# Patient Record
Sex: MALE | Race: WHITE | NOT HISPANIC OR LATINO | Employment: OTHER | URBAN - METROPOLITAN AREA
[De-identification: names, ages, dates, MRNs, and addresses within clinical notes are randomized per-mention and may not be internally consistent; named-entity substitution may affect disease eponyms.]

---

## 2021-02-13 ENCOUNTER — IMMUNIZATIONS (OUTPATIENT)
Dept: FAMILY MEDICINE CLINIC | Facility: HOSPITAL | Age: 86
End: 2021-02-13

## 2021-02-13 DIAGNOSIS — Z23 ENCOUNTER FOR IMMUNIZATION: Primary | ICD-10-CM

## 2021-02-13 PROCEDURE — 91301 SARS-COV-2 / COVID-19 MRNA VACCINE (MODERNA) 100 MCG: CPT

## 2021-02-13 PROCEDURE — 0011A SARS-COV-2 / COVID-19 MRNA VACCINE (MODERNA) 100 MCG: CPT

## 2021-03-13 ENCOUNTER — IMMUNIZATIONS (OUTPATIENT)
Dept: FAMILY MEDICINE CLINIC | Facility: HOSPITAL | Age: 86
End: 2021-03-13

## 2021-03-13 DIAGNOSIS — Z23 ENCOUNTER FOR IMMUNIZATION: Primary | ICD-10-CM

## 2021-03-13 PROCEDURE — 0012A SARS-COV-2 / COVID-19 MRNA VACCINE (MODERNA) 100 MCG: CPT

## 2021-03-13 PROCEDURE — 91301 SARS-COV-2 / COVID-19 MRNA VACCINE (MODERNA) 100 MCG: CPT

## 2024-08-09 ENCOUNTER — DOCUMENTATION (OUTPATIENT)
Dept: HEMATOLOGY ONCOLOGY | Facility: CLINIC | Age: 89
End: 2024-08-09

## 2024-08-09 NOTE — PROGRESS NOTES
Referral received/ Chart reviewed for work up completed for referral to radiation oncology     Imaging completed: NA    Pathology completed:  7/19/2024 at [facility]  accession #[id number]    PN please retrieve outside records.

## 2024-08-14 ENCOUNTER — PATIENT OUTREACH (OUTPATIENT)
Dept: HEMATOLOGY ONCOLOGY | Facility: CLINIC | Age: 89
End: 2024-08-14

## 2024-08-14 PROBLEM — C44.319 BASAL CELL CARCINOMA OF SKIN OF OTHER PARTS OF FACE: Status: ACTIVE | Noted: 2024-08-14

## 2024-08-14 NOTE — PROGRESS NOTES
Intake received, chart reviewed for need of external records.  Consulting: Dr. QIU  Scheduled on 8/19/2024  Dx: BASAL CELL   ICD: C44.319    Pathology requested:   From [facility]  phone [phone], via fax [phone]  Accession # [id number]  Slides will be sent directly to [facility] Pathology lab at [address] [place], [place] [address]e [facility].     Images requested:  From N/A phone , via fax   If not uploaded electronically via [employer], disks will be sent directly to the Radiology Reading Room.

## 2024-08-19 ENCOUNTER — CONSULT (OUTPATIENT)
Facility: HOSPITAL | Age: 89
End: 2024-08-19
Attending: RADIOLOGY
Payer: COMMERCIAL

## 2024-08-19 ENCOUNTER — TELEPHONE (OUTPATIENT)
Age: 89
End: 2024-08-19

## 2024-08-19 VITALS
HEIGHT: 66 IN | SYSTOLIC BLOOD PRESSURE: 118 MMHG | BODY MASS INDEX: 32.47 KG/M2 | WEIGHT: 202 LBS | RESPIRATION RATE: 16 BRPM | HEART RATE: 60 BPM | DIASTOLIC BLOOD PRESSURE: 74 MMHG | OXYGEN SATURATION: 96 % | TEMPERATURE: 97.2 F

## 2024-08-19 DIAGNOSIS — C44.319 BASAL CELL CARCINOMA OF SKIN OF OTHER PARTS OF FACE: Primary | ICD-10-CM

## 2024-08-19 PROCEDURE — 99205 OFFICE O/P NEW HI 60 MIN: CPT | Performed by: RADIOLOGY

## 2024-08-19 PROCEDURE — 99211 OFF/OP EST MAY X REQ PHY/QHP: CPT | Performed by: RADIOLOGY

## 2024-08-19 RX ORDER — SILDENAFIL CITRATE 20 MG/1
20 TABLET ORAL 3 TIMES DAILY
COMMUNITY

## 2024-08-19 RX ORDER — LOSARTAN POTASSIUM 50 MG/1
50 TABLET ORAL DAILY
COMMUNITY

## 2024-08-19 RX ORDER — FUROSEMIDE 40 MG
40 TABLET ORAL 2 TIMES DAILY
COMMUNITY

## 2024-08-19 NOTE — PROGRESS NOTES
Consultation - Radiation Oncology      MRN:[id number] : 1932  Encounter: [id number]  Patient Information: Dom Lees      CHIEF COMPLAINT  Chief Complaint   Patient presents with    Consult     Radiation oncology     Cancer Staging   Basal cell carcinoma of the nasal bridge, left forehead and left cheek.           History of Present Illness   Dom Lees is a 89 y.o. male with a history of multiple nonmelanomatous skin cancers who presents for consideration of treatment options.    Shave biopsy of the nasal bridge was performed on 5/10/24.  Pathology was consistent with basal cell carcinoma, nodular and infiltrating.    The patient had biopsies of the left medial cheek, right lateral zygoma and left lateral forehead performed on 2024.  Pathology from the left medial check was consistent with basal cell carcinoma, nodular.  Shave biopsy of the right lateral zygoma was also basal cell carcinoma, nodular.  Finally, shave biopsy of the left lateral forehead was basal cell carcinoma, nodular and infiltrating with morpheaform features.    He reportedly underwent Mohs procedure for management of the lesion on the right lateral zygoma.      He was referred for consideration of radiation to the nasal bridge of the node and left lateral forehead/left medial cheek.  .      Upon interview, he endorses the above history.  He notes an ulcerative lesion on the bridge of the nose with rolled borders.  He has several hyperkeratotic and hyperpigmented lesions as well as skin tags across the face and he is not certain which of these were biopsied.  He has a well healed Mohs resection site over the right cheek.  He is without additional acute concerns.    Historical Information   Oncology History   Basal cell carcinoma of skin of other parts of face   5/10/2024 Biopsy    Performed at [facility]    FINAL DIAGNOSIS:  SKIN, Nasal bridge, shave:  Basal cell carcinoma, nodular and infiltrating.  pTxMx     7/19/2024 Biopsy    Performed at [facility]    FINAL DIAGNOSIS:  SKIN, Left medial cheek, Shave:         Basal cell carcinoma, nodular. pTxMx  SKIN, Right lateral zygoma, Shave:         Basal cell carcinoma, nodular. pTxMx  3.    SKIN, Left lateral forehead, Shave:         Basal cell carcinoma, nodular and infiltrating with morpheaform features. pTxMx       8/14/2024 Initial Diagnosis    Basal cell carcinoma of skin of other parts of face           Past Medical History:   Diagnosis Date    BPH (benign prostatic hypertrophy)     Hyperlipidemia     Hypertension      Past Surgical History:   Procedure Laterality Date    HERNIA REPAIR      JOINT REPLACEMENT      right TKR    NJ XCAPSL CTRC RMVL INSJ IO LENS PROSTH W/O ECP Right 8/3/2016    Procedure: OD EXTRACTION EXTRACAPSULAR CATARACT PHACO INTRAOCULAR LENS (IOL);  Surgeon: Dallas Geiger MD;  Location:  MAIN OR;  Service: Ophthalmology    RETINAL DETACHMENT SURGERY N/A     pt not sure which one       History reviewed. No pertinent family history.    Social History   Social History     Substance and Sexual Activity   Alcohol Use Not Currently     Social History     Substance and Sexual Activity   Drug Use No     Social History     Tobacco Use   Smoking Status Never   Smokeless Tobacco Not on file         Meds/Allergies     Current Outpatient Medications:     apixaban (Eliquis) 2.5 mg, Take by mouth 2 (two) times a day, Disp: , Rfl:     aspirin 81 MG tablet, Take 81 mg by mouth daily., Disp: , Rfl:     cyanocobalamin 1000 MCG tablet, Take 100 mcg by mouth daily., Disp: , Rfl:     furosemide (LASIX) 40 mg tablet, Take 40 mg by mouth 2 (two) times a day, Disp: , Rfl:     losartan (COZAAR) 50 mg tablet, Take 50 mg by mouth daily, Disp: , Rfl:     metFORMIN (GLUMETZA) 1000 MG (MOD) 24 hr tablet, Take 1,000 mg by mouth 2 (two) times a day., Disp: , Rfl:     sildenafil (REVATIO) 20 mg tablet, Take 20 mg by mouth 3 (three) times a day, Disp:  ", Rfl:     simvastatin (ZOCOR) 20 mg tablet, Take 20 mg by mouth daily at bedtime., Disp: , Rfl:     tamsulosin (FLOMAX) 0.4 mg, Take 0.4 mg by mouth daily with dinner., Disp: , Rfl:     valsartan-hydrochlorothiazide (DIOVAN-HCT) 160-12.5 MG per tablet, Take 1 tablet by mouth daily., Disp: , Rfl:     amLODIPine (NORVASC) 5 mg tablet, Take 5 mg by mouth daily. (Patient not taking: Reported on 8/19/2024), Disp: , Rfl:     glimepiride (AMARYL) 2 mg tablet, Take 2 mg by mouth every morning before breakfast. (Patient not taking: Reported on 8/19/2024), Disp: , Rfl:   No Known Allergies      Review of Systems  Constitutional: Negative.    HENT: Negative.     Eyes: Negative.    Respiratory: Negative.     Cardiovascular: Negative.    Gastrointestinal: Negative.    Endocrine: Negative.    Genitourinary:  Positive for frequency.   Musculoskeletal: Negative.    Skin: Negative.    Allergic/Immunologic: Negative.    Neurological: Negative.    Hematological:  Bruises/bleeds easily.   Psychiatric/Behavioral: Negative.         OBJECTIVE:   /74 (BP Location: Left arm, Patient Position: Sitting, Cuff Size: Standard)   Pulse 60   Temp (!) 97.2 °F (36.2 °C) (Temporal)   Resp 16   Ht 5' 6\" (1.676 m)   Wt 91.6 kg (202 lb)   SpO2 96%   BMI 32.60 kg/m²   Pain Assessment:  0  Performance Status: Karnofsky: 90 - Able to carry on normal activity; minor signs or symptoms of disease     Physical Exam  HENT:      Head: Normocephalic and atraumatic.   Eyes:      General: No scleral icterus.     Extraocular Movements: Extraocular movements intact.   Cardiovascular:      Rate and Rhythm: Normal rate.   Pulmonary:      Effort: Pulmonary effort is normal.   Abdominal:      General: Abdomen is flat. There is no distension.   Musculoskeletal:         General: Normal range of motion.      Cervical back: Normal range of motion.   Skin:     General: Skin is warm and dry.      Comments: 2 cm ulcerative lesion involving the mid portion of the " "nasal bridge with rolled borders.  3 mm minimally hyperkeratotic lesion involving the left inferior cheek and 1.5 cm lesion involving the left forehead.     Neurological:      General: No focal deficit present.      Mental Status: He is alert.   Psychiatric:         Mood and Affect: Mood normal.          RESULTS  Lab Results    Chemistry        Component Value Date/Time    K 4.1 07/15/2016 1139     07/15/2016 1139    CO2 27 07/15/2016 1139    BUN 27 (H) 07/15/2016 1139    CREATININE 1.41 (H) 07/15/2016 1139        Component Value Date/Time    CALCIUM 8.8 07/15/2016 1139    ALKPHOS 57 07/15/2016 1139    AST 14 07/15/2016 1139    ALT 19 07/15/2016 1139            No results found for: \"WBC\", \"HGB\", \"HCT\", \"MCV\", \"PLT\"      Imaging Studies  No results found.      Pathology: See above.        ASSESSMENT  No diagnosis found.  Cancer Staging   Basal cell carcinoma of the nasal bridge, left forehead and left cheek.        PLAN/DISCUSSION  No orders of the defined types were placed in this encounter.         Dom Lees is a 89 y.o. male with a history of multiple basal cell carcinomas of the face.  He recently completed Mohs resection of a right cheek lesion. He has biopsy proven lesions involving the nasal bridge, the left cheek and the left forehead.  He is considering definitive treatment options.    I reviewed the diagnosis of cutaneous basal cell carcinoma and curative treatment options which include Mohs or radical surgical excision or definitive radiotherapy. If he declines surgery or is not willing to accept the cosmetic outcomes of definitive surgical resection, definitive radiotherapy can be considered. This is delivered daily over the course of 6 weeks. This requires mask formation for immobilization and creation of customized bolus to achieve sufficient skin surface dose. I reviewed risks of radiation therapy which include but are not limited to fatigue, skin irritation, skin desquamation/peeling, " "possible infection, cartilage necrosis, nasal irritation or pain, epistaxis or nasal crusting, excessive tearing, cataract formation, hair loss in the treatment area, changes in or loss of smell, skin pigment changes, fibrosis, telangiectasis and secondary malignancy.   Radiation would be reasonable for management of the nasal bridge given the challenging anatomic location.  The forehead lesion can be treated concurrently; however, a field encompassing the left cheek may be rather large.  He will return to his dermatologist to have photos of the discrete lesions taken for treatment planning purposes and coordination of care.     Alternatives to radiotherapy would include no further treatment (lesions would continue to grow, ulcerate and become more symptomatic), vismodegib followed by consideration of surgical resection, and vismodegib alone (not curative). He may also be a candidate for immunotherapy or cemiplimab.      After considering the above, he wishes to proceed with definitive radiation targeting at least the nasal bridge and the left forehead.  He will discuss Mohs resection of the left cheek lesion with his dermatologist at the time of repeat visit.    He will return for CT simulation in the near future. Informed consent was obtained today. All questions were answered to his satisfaction.    Thank you for involving me in Mr. Lees's care.    Lauri Whitehead MD  8/19/2024,11:05 AM      Portions of the record may have been created with voice recognition software.  Occasional wrong word or \"sound a like\" substitutions may have occurred due to the inherent limitations of voice recognition software.  Read the chart carefully and recognize, using context, where substitutions have occurred.        "

## 2024-08-19 NOTE — PROGRESS NOTES
Dom Lees 2/23/1932 is a 89 y.o. male With Basal cell carcinoma. He was referred by Dr. Morales from [facility]. He is being seen today for Radiation Oncology consult.    Patient with basal cell carcinoma on his nasal bridge which was biopsied 5/10/24 at [facility]. On 7/19/24 he had areas on his left medial neck, right lateral zygoma and left lateral forehead biopsied in office. Areas also positive for basal cell carcinoma. MOHS performed on 8/7/24.     Oncology History   Basal cell carcinoma of skin of other parts of face   5/10/2024 Biopsy    Performed at [facility]    FINAL DIAGNOSIS:  SKIN, Nasal bridge, shave:  Basal cell carcinoma, nodular and infiltrating. pTxMx     7/19/2024 Biopsy    Performed at [facility]    FINAL DIAGNOSIS:  SKIN, Left medial cheek, Shave:         Basal cell carcinoma, nodular. pTxMx  SKIN, Right lateral zygoma, Shave:         Basal cell carcinoma, nodular. pTxMx  3.    SKIN, Left lateral forehead, Shave:         Basal cell carcinoma, nodular and infiltrating with morpheaform features. pTxMx       8/14/2024 Initial Diagnosis    Basal cell carcinoma of skin of other parts of face         Review of Systems:  Review of Systems   Constitutional: Negative.    HENT: Negative.     Eyes: Negative.    Respiratory: Negative.     Cardiovascular: Negative.    Gastrointestinal: Negative.    Endocrine: Negative.    Genitourinary:  Positive for frequency.   Musculoskeletal: Negative.    Skin: Negative.    Allergic/Immunologic: Negative.    Neurological: Negative.    Hematological:  Bruises/bleeds easily.   Psychiatric/Behavioral: Negative.         Clinical Trial: no          Pain assessment: 0    PSA n/a     PFT n/a     Prior Radiation n/a    Teaching booklet / handouts    MST     Implantable Devices (Port, pacemaker, pain stimulator) pacemaker, [place] 2022    Hip Replacement no    Health Maintenance    Topic Date Due    Medicare Annual Wellness Visit (AWV)  Never done    Depression Screening  Never done    BMI: Adult  Never done    Zoster Vaccine (1 of 2) Never done    RSV Vaccine Age 60+ Years (1 - 1-dose 60+ series) Never done    Fall Risk  Never done    Pneumococcal Vaccine: 65+ Years (1 of 1 - PCV) Never done    COVID-19 Vaccine (6 - 2023-24 season) 09/01/2023    Influenza Vaccine (1) 09/01/2024    RSV Vaccine age 0-20 Months  Aged Out    HIB Vaccine  Aged Out    IPV Vaccine  Aged Out    Hepatitis A Vaccine  Aged Out    Meningococcal ACWY Vaccine  Aged Out    HPV Vaccine  Aged Out       Past Medical History:   Diagnosis Date    BPH (benign prostatic hypertrophy)     Hyperlipidemia     Hypertension        Past Surgical History:   Procedure Laterality Date    HERNIA REPAIR      JOINT REPLACEMENT      right TKR    AL REMV CATARACT EXTRACAP,INSERT LENS Right 8/3/2016    Procedure: OD EXTRACTION EXTRACAPSULAR CATARACT PHACO INTRAOCULAR LENS (IOL);  Surgeon: Dallas Geiger MD;  Location: [facility] OR;  Service: Ophthalmology    RETINAL DETACHMENT SURGERY N/A     pt not sure which one       No family history on file.    Social History     Tobacco Use    Smoking status: Never   Substance Use Topics    Alcohol use: Yes     Comment: socially    Drug use: No          Current Outpatient Medications:     amLODIPine (NORVASC) 5 mg tablet, Take 5 mg by mouth daily., Disp: , Rfl:     aspirin 81 MG tablet, Take 81 mg by mouth daily., Disp: , Rfl:     cyanocobalamin 1000 MCG tablet, Take 100 mcg by mouth daily., Disp: , Rfl:     glimepiride (AMARYL) 2 mg tablet, Take 2 mg by mouth every morning before breakfast., Disp: , Rfl:     metFORMIN (GLUMETZA) 1000 MG (MOD) 24 hr tablet, Take 1,000 mg by mouth 2 (two) times a day., Disp: , Rfl:     simvastatin (ZOCOR) 20 mg tablet, Take 20 mg by mouth daily at bedtime., Disp: , Rfl:     tamsulosin (FLOMAX) 0.4 mg, Take 0.4 mg by mouth daily with dinner., Disp: , Rfl:      valsartan-hydrochlorothiazide (DIOVAN-HCT) 160-12.5 MG per tablet, Take 1 tablet by mouth daily., Disp: , Rfl:     No Known Allergies     There were no vitals filed for this visit.

## 2024-08-19 NOTE — TELEPHONE ENCOUNTER
Leydi calling from [facility] to inform Dr. Whitehead she is emailing picture to she area where radiation treatment should be on patients forehead.

## 2024-08-21 ENCOUNTER — TELEPHONE (OUTPATIENT)
Dept: RADIATION ONCOLOGY | Facility: HOSPITAL | Age: 89
End: 2024-08-21

## 2024-08-21 ENCOUNTER — TELEPHONE (OUTPATIENT)
Age: 89
End: 2024-08-21

## 2024-08-21 NOTE — TELEPHONE ENCOUNTER
Leydi from [facility] was calling in to confirm that Dr Whitehead received the email of the photos of patient that were sent on 8/15/24. She states they were emailed to [email or web address]     Please call her to confirm if these were or were not received at [phone] ext [phone]

## 2024-08-21 NOTE — TELEPHONE ENCOUNTER
Pt's daughter called to make sure photos were received and Dr. Whitehead had the information he needed. She asked if the pt can be called to discuss the photos

## 2024-08-22 NOTE — TELEPHONE ENCOUNTER
Discussed with Dr. Whitehead. Left patient a voicemail explaining that the photos were received and Dr. Whitehead can treat the nose and forehead. He did not receive a photo of the left cheek and he is still recommending discussing MOH's with dermatology for this. Instructed to call back with any questions.

## 2024-08-27 ENCOUNTER — TELEPHONE (OUTPATIENT)
Age: 89
End: 2024-08-27

## 2024-08-27 NOTE — TELEPHONE ENCOUNTER
Patient daughter, Enma, calling in, would like a call back, stated that they have been waiting on a call back to discuss next steps to treat nose and forehead. Please call at # [phone]

## 2024-09-03 ENCOUNTER — TELEPHONE (OUTPATIENT)
Age: 89
End: 2024-09-03

## 2024-09-03 NOTE — TELEPHONE ENCOUNTER
Spoke to daughter. RN inquired if they have yet discussed MOHS procedure with dermatology. He will not be getting until after Radiation. Nothing is scheduled yet with Dermatology yet as they wanted to get radiation first. RN did update Radiation Oncology MD on same. RN will call daughter back to discuss further after speaking with MD.

## 2024-09-03 NOTE — TELEPHONE ENCOUNTER
Leydi was calling from [facility] asking for a status update. She said the patietns daughter keeps calling them stating that we have not contacted them with the next step in [facility] care. They are asking that we call the daughter back and inform her of the next step in the process

## 2024-09-04 NOTE — TELEPHONE ENCOUNTER
Called daughter and left voice mail. Aware next step is to schedule CT sim for Radiation Planning. Aware will have our office reach out tomorrow to scheduled CT SIM

## 2024-09-04 NOTE — TELEPHONE ENCOUNTER
Spoke to [facility] office. Last visit was 8/14/24 for suture removal. Then spoke to Leydi. They are waiting to scheduled the MOHS procedure for after the Radiation is completed. Radiation Oncology MD aware and next step is to schedule CT SIM.

## 2024-09-05 ENCOUNTER — LAB REQUISITION (OUTPATIENT)
Dept: LAB | Facility: HOSPITAL | Age: 89
End: 2024-09-05
Payer: COMMERCIAL

## 2024-09-05 DIAGNOSIS — C44.319 BASAL CELL CARCINOMA OF SKIN OF OTHER PARTS OF FACE: ICD-10-CM

## 2024-09-05 PROCEDURE — 88321 CONSLTJ&REPRT SLD PREP ELSWR: CPT | Performed by: STUDENT IN AN ORGANIZED HEALTH CARE EDUCATION/TRAINING PROGRAM

## 2024-09-06 ENCOUNTER — TELEPHONE (OUTPATIENT)
Facility: HOSPITAL | Age: 89
End: 2024-09-06

## 2024-10-01 ENCOUNTER — APPOINTMENT (OUTPATIENT)
Facility: HOSPITAL | Age: 89
End: 2024-10-01
Attending: RADIOLOGY
Payer: COMMERCIAL

## 2024-10-08 PROCEDURE — 77300 RADIATION THERAPY DOSE PLAN: CPT | Performed by: RADIOLOGY

## 2024-10-08 PROCEDURE — 77338 DESIGN MLC DEVICE FOR IMRT: CPT | Performed by: RADIOLOGY

## 2024-10-08 PROCEDURE — 77301 RADIOTHERAPY DOSE PLAN IMRT: CPT | Performed by: RADIOLOGY

## 2024-10-14 ENCOUNTER — APPOINTMENT (OUTPATIENT)
Facility: HOSPITAL | Age: 89
End: 2024-10-14
Attending: RADIOLOGY
Payer: COMMERCIAL

## 2024-10-15 ENCOUNTER — APPOINTMENT (OUTPATIENT)
Facility: HOSPITAL | Age: 89
End: 2024-10-15
Attending: RADIOLOGY
Payer: COMMERCIAL

## 2024-10-15 PROCEDURE — 77386 HB NTSTY MODUL RAD TX DLVR CPLX: CPT | Performed by: RADIOLOGY

## 2024-10-15 PROCEDURE — 77427 RADIATION TX MANAGEMENT X5: CPT | Performed by: RADIOLOGY

## 2024-10-15 PROCEDURE — 77387 GUIDANCE FOR RADJ TX DLVR: CPT | Performed by: RADIOLOGY

## 2024-10-16 PROCEDURE — 77386 HB NTSTY MODUL RAD TX DLVR CPLX: CPT | Performed by: RADIOLOGY

## 2024-10-16 PROCEDURE — 77387 GUIDANCE FOR RADJ TX DLVR: CPT | Performed by: RADIOLOGY

## 2024-10-17 PROCEDURE — 77386 HB NTSTY MODUL RAD TX DLVR CPLX: CPT | Performed by: RADIOLOGY

## 2024-10-17 PROCEDURE — 77387 GUIDANCE FOR RADJ TX DLVR: CPT | Performed by: RADIOLOGY

## 2024-10-18 PROCEDURE — 77387 GUIDANCE FOR RADJ TX DLVR: CPT | Performed by: RADIOLOGY

## 2024-10-18 PROCEDURE — 77386 HB NTSTY MODUL RAD TX DLVR CPLX: CPT | Performed by: RADIOLOGY

## 2024-10-21 PROCEDURE — 77387 GUIDANCE FOR RADJ TX DLVR: CPT | Performed by: RADIOLOGY

## 2024-10-21 PROCEDURE — 77386 HB NTSTY MODUL RAD TX DLVR CPLX: CPT | Performed by: RADIOLOGY

## 2024-10-21 PROCEDURE — 77336 RADIATION PHYSICS CONSULT: CPT | Performed by: RADIOLOGY

## 2024-10-22 PROCEDURE — 77387 GUIDANCE FOR RADJ TX DLVR: CPT | Performed by: RADIOLOGY

## 2024-10-22 PROCEDURE — 77427 RADIATION TX MANAGEMENT X5: CPT | Performed by: RADIOLOGY

## 2024-10-22 PROCEDURE — 77386 HB NTSTY MODUL RAD TX DLVR CPLX: CPT | Performed by: RADIOLOGY

## 2024-10-23 PROCEDURE — 77386 HB NTSTY MODUL RAD TX DLVR CPLX: CPT | Performed by: RADIOLOGY

## 2024-10-23 PROCEDURE — 77387 GUIDANCE FOR RADJ TX DLVR: CPT | Performed by: RADIOLOGY

## 2024-10-24 PROCEDURE — 77386 HB NTSTY MODUL RAD TX DLVR CPLX: CPT | Performed by: RADIOLOGY

## 2024-10-24 PROCEDURE — 77387 GUIDANCE FOR RADJ TX DLVR: CPT | Performed by: RADIOLOGY

## 2024-10-25 PROCEDURE — 77387 GUIDANCE FOR RADJ TX DLVR: CPT | Performed by: RADIOLOGY

## 2024-10-25 PROCEDURE — 77386 HB NTSTY MODUL RAD TX DLVR CPLX: CPT | Performed by: RADIOLOGY

## 2024-10-28 PROCEDURE — 77387 GUIDANCE FOR RADJ TX DLVR: CPT | Performed by: RADIOLOGY

## 2024-10-28 PROCEDURE — 77336 RADIATION PHYSICS CONSULT: CPT | Performed by: RADIOLOGY

## 2024-10-28 PROCEDURE — 77386 HB NTSTY MODUL RAD TX DLVR CPLX: CPT | Performed by: RADIOLOGY

## 2024-10-29 ENCOUNTER — PATIENT OUTREACH (OUTPATIENT)
Dept: HEMATOLOGY ONCOLOGY | Facility: CLINIC | Age: 89
End: 2024-10-29

## 2024-10-29 PROCEDURE — 77387 GUIDANCE FOR RADJ TX DLVR: CPT | Performed by: RADIOLOGY

## 2024-10-29 PROCEDURE — 77386 HB NTSTY MODUL RAD TX DLVR CPLX: CPT | Performed by: RADIOLOGY

## 2024-10-29 PROCEDURE — 77427 RADIATION TX MANAGEMENT X5: CPT | Performed by: RADIOLOGY

## 2024-10-29 NOTE — PROGRESS NOTES
I reached out to VIRGIE to complete the Distress Thermometer, review for any barriers to care and to offer any supportive services that may be needed. I left  with the reason for my call including my direct phone number [phone].

## 2024-10-30 PROCEDURE — 77386 HB NTSTY MODUL RAD TX DLVR CPLX: CPT | Performed by: RADIOLOGY

## 2024-10-30 PROCEDURE — 77387 GUIDANCE FOR RADJ TX DLVR: CPT | Performed by: RADIOLOGY

## 2024-10-31 PROCEDURE — 77386 HB NTSTY MODUL RAD TX DLVR CPLX: CPT | Performed by: RADIOLOGY

## 2024-10-31 PROCEDURE — 77387 GUIDANCE FOR RADJ TX DLVR: CPT | Performed by: RADIOLOGY

## 2024-11-01 ENCOUNTER — APPOINTMENT (OUTPATIENT)
Facility: HOSPITAL | Age: 89
End: 2024-11-01
Attending: RADIOLOGY
Payer: COMMERCIAL

## 2024-11-01 PROCEDURE — 77386 HB NTSTY MODUL RAD TX DLVR CPLX: CPT | Performed by: RADIOLOGY

## 2024-11-01 PROCEDURE — 77387 GUIDANCE FOR RADJ TX DLVR: CPT | Performed by: RADIOLOGY

## 2024-11-04 PROCEDURE — 77336 RADIATION PHYSICS CONSULT: CPT | Performed by: RADIOLOGY

## 2024-11-04 PROCEDURE — 77387 GUIDANCE FOR RADJ TX DLVR: CPT | Performed by: RADIOLOGY

## 2024-11-04 PROCEDURE — 77386 HB NTSTY MODUL RAD TX DLVR CPLX: CPT | Performed by: RADIOLOGY

## 2024-11-05 PROCEDURE — 77387 GUIDANCE FOR RADJ TX DLVR: CPT | Performed by: RADIOLOGY

## 2024-11-05 PROCEDURE — 77427 RADIATION TX MANAGEMENT X5: CPT | Performed by: RADIOLOGY

## 2024-11-05 PROCEDURE — 77386 HB NTSTY MODUL RAD TX DLVR CPLX: CPT | Performed by: RADIOLOGY

## 2024-11-06 PROCEDURE — 77387 GUIDANCE FOR RADJ TX DLVR: CPT | Performed by: RADIOLOGY

## 2024-11-06 PROCEDURE — 77386 HB NTSTY MODUL RAD TX DLVR CPLX: CPT | Performed by: RADIOLOGY

## 2024-11-07 PROCEDURE — 77386 HB NTSTY MODUL RAD TX DLVR CPLX: CPT | Performed by: STUDENT IN AN ORGANIZED HEALTH CARE EDUCATION/TRAINING PROGRAM

## 2024-11-07 PROCEDURE — 77387 GUIDANCE FOR RADJ TX DLVR: CPT | Performed by: STUDENT IN AN ORGANIZED HEALTH CARE EDUCATION/TRAINING PROGRAM

## 2024-11-08 PROCEDURE — 77387 GUIDANCE FOR RADJ TX DLVR: CPT | Performed by: RADIOLOGY

## 2024-11-08 PROCEDURE — 77386 HB NTSTY MODUL RAD TX DLVR CPLX: CPT | Performed by: RADIOLOGY

## 2024-11-11 PROCEDURE — 77386 HB NTSTY MODUL RAD TX DLVR CPLX: CPT | Performed by: RADIOLOGY

## 2024-11-11 PROCEDURE — 77387 GUIDANCE FOR RADJ TX DLVR: CPT | Performed by: RADIOLOGY

## 2024-11-11 PROCEDURE — 77336 RADIATION PHYSICS CONSULT: CPT | Performed by: RADIOLOGY

## 2024-11-12 PROCEDURE — 77387 GUIDANCE FOR RADJ TX DLVR: CPT | Performed by: RADIOLOGY

## 2024-11-12 PROCEDURE — 77386 HB NTSTY MODUL RAD TX DLVR CPLX: CPT | Performed by: RADIOLOGY

## 2024-11-12 PROCEDURE — 77427 RADIATION TX MANAGEMENT X5: CPT | Performed by: RADIOLOGY

## 2024-11-14 PROCEDURE — 77386 HB NTSTY MODUL RAD TX DLVR CPLX: CPT | Performed by: RADIOLOGY

## 2024-11-14 PROCEDURE — 77387 GUIDANCE FOR RADJ TX DLVR: CPT | Performed by: RADIOLOGY

## 2024-11-15 PROCEDURE — 77387 GUIDANCE FOR RADJ TX DLVR: CPT | Performed by: RADIOLOGY

## 2024-11-15 PROCEDURE — 77386 HB NTSTY MODUL RAD TX DLVR CPLX: CPT | Performed by: RADIOLOGY

## 2024-11-18 PROCEDURE — 77386 HB NTSTY MODUL RAD TX DLVR CPLX: CPT | Performed by: RADIOLOGY

## 2024-11-18 PROCEDURE — 77387 GUIDANCE FOR RADJ TX DLVR: CPT | Performed by: RADIOLOGY

## 2024-11-19 PROCEDURE — 77387 GUIDANCE FOR RADJ TX DLVR: CPT | Performed by: RADIOLOGY

## 2024-11-19 PROCEDURE — 77386 HB NTSTY MODUL RAD TX DLVR CPLX: CPT | Performed by: RADIOLOGY

## 2024-11-19 PROCEDURE — 77336 RADIATION PHYSICS CONSULT: CPT | Performed by: RADIOLOGY

## 2024-11-20 PROCEDURE — 77387 GUIDANCE FOR RADJ TX DLVR: CPT | Performed by: RADIOLOGY

## 2024-11-20 PROCEDURE — 77427 RADIATION TX MANAGEMENT X5: CPT | Performed by: RADIOLOGY

## 2024-11-20 PROCEDURE — 77386 HB NTSTY MODUL RAD TX DLVR CPLX: CPT | Performed by: RADIOLOGY

## 2024-11-21 PROCEDURE — 77386 HB NTSTY MODUL RAD TX DLVR CPLX: CPT | Performed by: RADIOLOGY

## 2024-11-21 PROCEDURE — 77387 GUIDANCE FOR RADJ TX DLVR: CPT | Performed by: RADIOLOGY

## 2024-11-22 PROCEDURE — 77387 GUIDANCE FOR RADJ TX DLVR: CPT | Performed by: RADIOLOGY

## 2024-11-22 PROCEDURE — 77386 HB NTSTY MODUL RAD TX DLVR CPLX: CPT | Performed by: RADIOLOGY

## 2024-11-25 PROCEDURE — 77387 GUIDANCE FOR RADJ TX DLVR: CPT | Performed by: RADIOLOGY

## 2024-11-25 PROCEDURE — 77386 HB NTSTY MODUL RAD TX DLVR CPLX: CPT | Performed by: RADIOLOGY

## 2024-11-26 PROCEDURE — 77387 GUIDANCE FOR RADJ TX DLVR: CPT | Performed by: RADIOLOGY

## 2024-11-26 PROCEDURE — 77386 HB NTSTY MODUL RAD TX DLVR CPLX: CPT | Performed by: RADIOLOGY

## 2024-11-26 PROCEDURE — 77336 RADIATION PHYSICS CONSULT: CPT | Performed by: RADIOLOGY

## 2024-12-27 ENCOUNTER — OFFICE VISIT (OUTPATIENT)
Facility: HOSPITAL | Age: 89
End: 2024-12-27
Attending: RADIOLOGY

## 2024-12-27 VITALS
OXYGEN SATURATION: 97 % | SYSTOLIC BLOOD PRESSURE: 132 MMHG | TEMPERATURE: 97.6 F | DIASTOLIC BLOOD PRESSURE: 80 MMHG | HEART RATE: 92 BPM | RESPIRATION RATE: 18 BRPM

## 2024-12-27 DIAGNOSIS — C44.319 BASAL CELL CARCINOMA OF SKIN OF OTHER PARTS OF FACE: Primary | ICD-10-CM

## 2024-12-27 PROCEDURE — 99024 POSTOP FOLLOW-UP VISIT: CPT | Performed by: RADIOLOGY

## 2024-12-27 NOTE — PROGRESS NOTES
"Follow-up Visit   Name: Dom Lees      : 1932      MRN: [id number]  Encounter Provider: Lauri Whitehead MD  Encounter Date: 2024   Encounter department: [facility] RADIATION ONCOLOGY  :  Assessment & Plan  Basal cell carcinoma of skin of other parts of face  Mr. Lees is a 89 year old man with a history of multiple basal cell carcinomas of the face.  He recently completed definitive radiation to a lesion involving the bridge of the nose and the left forehead.  He underwent Moh's resection of a lesion on the left cheek.  He presents for routine follow up.    Skin is well healed with mild pigment changes.  No evidence of recurrent disease.  Going forward, he will continue dermatologic surveillance.  Skin care and safe sun practices were discussed.  He was encouraged to call with any future questions/concerns.           History of Present Illness   Mr. Lees is a 89 year old with the above history.    Upon interview, he is pleased with the cosmetic outcome of recent skin radiation.  He denies recurrent disease.  He states he recently underwent Moh's resection of a lesion on the left cheek and \"they got it all.\"  He is without additional acute concerns.    Oncology History   Oncology History   Basal cell carcinoma of skin of other parts of face   5/10/2024 Biopsy    Performed at [facility]    FINAL DIAGNOSIS:  SKIN, Nasal bridge, shave:  Basal cell carcinoma, nodular and infiltrating. pTxMx     2024 Biopsy    Performed at [facility]    FINAL DIAGNOSIS:  SKIN, Left medial cheek, Shave:         Basal cell carcinoma, nodular. pTxMx  SKIN, Right lateral zygoma, Shave:         Basal cell carcinoma, nodular. pTxMx  3.    SKIN, Left lateral forehead, Shave:         Basal cell carcinoma, nodular and infiltrating with morpheaform features. pTxMx       2024 Initial Diagnosis    Basal cell carcinoma of skin of other parts of face   "   10/15/2024 - 11/26/2024 Radiation    Treatment:  Course: C1    Plan ID Energy Fractions Dose per Fraction (cGy) Dose Correction (cGy) Total Dose Delivered (cGy) Elapsed Days   L Forehead 9E 30 / 30 200 0 6,000 42   Nose 6X 30 / 30 200 0 6,000 42      Treatment dates:  C1: 10/15/2024 - 11/26/2024        Review of Systems Refer to nursing note.    Current Outpatient Medications on File Prior to Visit   Medication Sig Dispense Refill    amLODIPine (NORVASC) 5 mg tablet Take 5 mg by mouth daily      apixaban (Eliquis) 2.5 mg Take by mouth 2 (two) times a day      cyanocobalamin 1000 MCG tablet Take 100 mcg by mouth daily.      furosemide (LASIX) 40 mg tablet Take 40 mg by mouth 2 (two) times a day      losartan (COZAAR) 50 mg tablet Take 50 mg by mouth daily      metFORMIN (GLUMETZA) 1000 MG (MOD) 24 hr tablet Take 1,000 mg by mouth 2 (two) times a day.      sildenafil (REVATIO) 20 mg tablet Take 20 mg by mouth 3 (three) times a day      simvastatin (ZOCOR) 20 mg tablet Take 20 mg by mouth daily at bedtime.      tamsulosin (FLOMAX) 0.4 mg Take 0.4 mg by mouth daily with dinner.      valsartan-hydrochlorothiazide (DIOVAN-HCT) 160-12.5 MG per tablet Take 1 tablet by mouth daily.      aspirin 81 MG tablet Take 81 mg by mouth daily. (Patient not taking: Reported on 12/27/2024)      glimepiride (AMARYL) 2 mg tablet Take 2 mg by mouth every morning before breakfast. (Patient not taking: Reported on 12/27/2024)       No current facility-administered medications on file prior to visit.          Objective   /80   Pulse 92   Temp 97.6 °F (36.4 °C)   Resp 18   SpO2 97%     Pain Screening:  Pain Score: 0-No pain  ECOG  0  Physical Exam  Skin:     Comments: Hypopigmentation across the bridge of the nose, no residual ulcerative lesion.  Hypopigmentation with surrounding hyperpigmentation across the left forehead, no residual lesion.  Small scab over the left cheek, reports this is site of recent excision.             Administrative Statements   I have spent a total time of 10 minutes in caring for this patient on the day of the visit/encounter including Instructions for management, Patient and family education, Risk factor reductions, Counseling / Coordination of care, Documenting in the medical record, and Obtaining or reviewing history  .

## 2024-12-27 NOTE — ASSESSMENT & PLAN NOTE
Mr. Lees is a 89 year old man with a history of multiple basal cell carcinomas of the face.  He recently completed definitive radiation to a lesion involving the bridge of the nose and the left forehead.  He underwent Moh's resection of a lesion on the left cheek.  He presents for routine follow up.    Skin is well healed with mild pigment changes.  No evidence of recurrent disease.  Going forward, he will continue dermatologic surveillance.  Skin care and safe sun practices were discussed.  He was encouraged to call with any future questions/concerns.

## 2024-12-27 NOTE — PROGRESS NOTES
Dom Lees 2/23/1932 is a 89 y.o. male With basal cell carcinoma. He completed Radiation to left forehead and nose on 11/26/24. He returns today for one month follow up.     Follow up visit     Oncology History   Basal cell carcinoma of skin of other parts of face   5/10/2024 Biopsy    Performed at [facility]    FINAL DIAGNOSIS:  SKIN, Nasal bridge, shave:  Basal cell carcinoma, nodular and infiltrating. pTxMx     7/19/2024 Biopsy    Performed at [facility]    FINAL DIAGNOSIS:  SKIN, Left medial cheek, Shave:         Basal cell carcinoma, nodular. pTxMx  SKIN, Right lateral zygoma, Shave:         Basal cell carcinoma, nodular. pTxMx  3.    SKIN, Left lateral forehead, Shave:         Basal cell carcinoma, nodular and infiltrating with morpheaform features. pTxMx       8/14/2024 Initial Diagnosis    Basal cell carcinoma of skin of other parts of face     10/15/2024 - 11/26/2024 Radiation    Treatment:  Course: C1    Plan ID Energy Fractions Dose per Fraction (cGy) Dose Correction (cGy) Total Dose Delivered (cGy) Elapsed Days   L Forehead 9E 30 / 30 200 0 6,000 42   Nose 6X 30 / 30 200 0 6,000 42      Treatment dates:  C1: 10/15/2024 - 11/26/2024         Review of Systems:  Review of Systems   Constitutional: Negative.    HENT: Negative.     Eyes: Negative.    Respiratory: Negative.     Cardiovascular: Negative.    Gastrointestinal: Negative.    Endocrine: Negative.    Genitourinary:  Positive for frequency.   Musculoskeletal:  Positive for arthralgias (knees) and gait problem (uses cane).   Skin: Negative.         No redness noted to treatment area- continues to use Aquaphor  Did see [facility] for area under left eye   Allergic/Immunologic: Negative.    Hematological:  Does not bruise/bleed easily.   Psychiatric/Behavioral:  Positive for confusion (forgetful).                Health Maintenance   Topic Date Due    Medicare Annual Wellness Visit (AWV)  Never done     Pneumococcal Vaccine: 65+ Years (1 of 2 - PCV) Never done    BMI: Followup Plan  Never done    Zoster Vaccine (1 of 2) Never done    Fall Risk  Never done    RSV Vaccine Age 60+ Years (1 - 1-dose 75+ series) Never done    COVID-19 Vaccine (7 - 2024-25 season) 11/25/2024    Depression Screening  08/19/2025    BMI: Adult  08/19/2025    Influenza Vaccine  Completed    Meningococcal B Vaccine  Aged Out    RSV Vaccine age 0-20 Months  Aged Out    HIB Vaccine  Aged Out    IPV Vaccine  Aged Out    Hepatitis A Vaccine  Aged Out    Meningococcal ACWY Vaccine  Aged Out    HPV Vaccine  Aged Out     Patient Active Problem List   Diagnosis    Basal cell carcinoma of skin of other parts of face     Past Medical History:   Diagnosis Date    BPH (benign prostatic hypertrophy)     Hyperlipidemia     Hypertension      Past Surgical History:   Procedure Laterality Date    HERNIA REPAIR      JOINT REPLACEMENT      right TKR    UT XCAPSL CTRC RMVL INSJ IO LENS PROSTH W/O ECP Right 8/3/2016    Procedure: OD EXTRACTION EXTRACAPSULAR CATARACT PHACO INTRAOCULAR LENS (IOL);  Surgeon: Dallas Geiger MD;  Location: [facility] OR;  Service: Ophthalmology    RETINAL DETACHMENT SURGERY N/A     pt not sure which one     History reviewed. No pertinent family history.  Social History     Socioeconomic History    Marital status: /Civil Union     Spouse name: Not on file    Number of children: Not on file    Years of education: Not on file    Highest education level: Not on file   Occupational History    Not on file   Tobacco Use    Smoking status: Never    Smokeless tobacco: Not on file   Vaping Use    Vaping status: Never Used   Substance and Sexual Activity    Alcohol use: Not Currently    Drug use: No    Sexual activity: Not on file   Other Topics Concern    Not on file   Social History Narrative    Not on file     Social Drivers of Health     Financial Resource Strain: Not on file   Food Insecurity: Not on file   Transportation Needs:  Not on file   Physical Activity: Not on file   Stress: Not on file   Social Connections: Not on file   Intimate Partner Violence: Not on file   Housing Stability: Not on file       Current Outpatient Medications:     amLODIPine (NORVASC) 5 mg tablet, Take 5 mg by mouth daily, Disp: , Rfl:     apixaban (Eliquis) 2.5 mg, Take by mouth 2 (two) times a day, Disp: , Rfl:     cyanocobalamin 1000 MCG tablet, Take 100 mcg by mouth daily., Disp: , Rfl:     furosemide (LASIX) 40 mg tablet, Take 40 mg by mouth 2 (two) times a day, Disp: , Rfl:     losartan (COZAAR) 50 mg tablet, Take 50 mg by mouth daily, Disp: , Rfl:     metFORMIN (GLUMETZA) 1000 MG (MOD) 24 hr tablet, Take 1,000 mg by mouth 2 (two) times a day., Disp: , Rfl:     sildenafil (REVATIO) 20 mg tablet, Take 20 mg by mouth 3 (three) times a day, Disp: , Rfl:     simvastatin (ZOCOR) 20 mg tablet, Take 20 mg by mouth daily at bedtime., Disp: , Rfl:     tamsulosin (FLOMAX) 0.4 mg, Take 0.4 mg by mouth daily with dinner., Disp: , Rfl:     valsartan-hydrochlorothiazide (DIOVAN-HCT) 160-12.5 MG per tablet, Take 1 tablet by mouth daily., Disp: , Rfl:     aspirin 81 MG tablet, Take 81 mg by mouth daily. (Patient not taking: Reported on 12/27/2024), Disp: , Rfl:     glimepiride (AMARYL) 2 mg tablet, Take 2 mg by mouth every morning before breakfast. (Patient not taking: Reported on 12/27/2024), Disp: , Rfl:   No Known Allergies  Vitals:    12/27/24 1258   BP: 132/80   Pulse: 92   Resp: 18   Temp: 97.6 °F (36.4 °C)   SpO2: 97%      Pain Score: 0-No pain